# Patient Record
Sex: MALE | Race: WHITE | NOT HISPANIC OR LATINO | Employment: STUDENT | URBAN - METROPOLITAN AREA
[De-identification: names, ages, dates, MRNs, and addresses within clinical notes are randomized per-mention and may not be internally consistent; named-entity substitution may affect disease eponyms.]

---

## 2023-07-18 LAB
HBA1C MFR BLD HPLC: 5.6 %
HCV AB SER-ACNC: NONREACTIVE

## 2024-06-20 ENCOUNTER — TELEPHONE (OUTPATIENT)
Dept: BARIATRICS | Facility: CLINIC | Age: 24
End: 2024-06-20

## 2024-06-20 ENCOUNTER — OFFICE VISIT (OUTPATIENT)
Dept: BARIATRICS | Facility: CLINIC | Age: 24
End: 2024-06-20
Payer: COMMERCIAL

## 2024-06-20 VITALS
BODY MASS INDEX: 39.72 KG/M2 | SYSTOLIC BLOOD PRESSURE: 130 MMHG | HEART RATE: 102 BPM | WEIGHT: 238.4 LBS | DIASTOLIC BLOOD PRESSURE: 64 MMHG | HEIGHT: 65 IN

## 2024-06-20 DIAGNOSIS — E66.01 OBESITY, CLASS III, BMI 40-49.9 (MORBID OBESITY) (HCC): Primary | ICD-10-CM

## 2024-06-20 PROCEDURE — 99204 OFFICE O/P NEW MOD 45 MIN: CPT | Performed by: INTERNAL MEDICINE

## 2024-06-20 RX ORDER — TIRZEPATIDE 2.5 MG/.5ML
2.5 INJECTION, SOLUTION SUBCUTANEOUS WEEKLY
Qty: 2 ML | Refills: 0 | Status: SHIPPED | OUTPATIENT
Start: 2024-06-20 | End: 2024-08-15

## 2024-06-20 NOTE — TELEPHONE ENCOUNTER
Faxed received form pharmacy for Zepbound PA. Please initiate. Form will be scanned and attached to encounter.    Key: [id number]

## 2024-06-20 NOTE — PROGRESS NOTES
Assessment/Plan     Octaviano Licea is 23 y.o. year old male  who comes in for consultation for assistance with weight management.     - Discussed options of HealthyCORE-Intensive Lifestyle Intervention Program, Very Low Calorie Diet-VLCD, and Conservative Program and the role of weight loss medications.  - Patient is interested in pursuing Conservative Program    Obesity, Class III, BMI 40-49.9 (morbid obesity) ([facility])  Nutrition: Patient expressed interest in working with RD to balance his nutrition.  Patient reports his biggest challenge is having everyone who lives with him that is his parents and sister to agree upon an eating plan as the patient and his father are the primary people who cook in the house.  Patient reports his sister is more on board with healthy eating plan  -Would like to schedule RD visit bundles for nutrition goal and protein target  -Encourage patient to day with a protein rich breakfast and could consider meal replacement shakes  -Incorporate whole food and protein based snacks in between meals    Physical Activity: Provided information regarding [employer]; patient would like to discuss with sister as they might decide to join a gym together; follow general guidelines below for for aerobic and strength training    Sleep: -Recent sleep study in 2022 showed no evidence of sleep apnea patient sleeps 8+ hours    Behavioral/Stress: Start tracking current intake using MFP, so adjustments can be made by the dietitian.    Antiobesity Medications/Medical --class III obesity BMI of 40  -Patient would like to try injectable medication; prescription for Zepbound was submitted given his weight loss goals  -Other medication options that could be considered in combination as no obvious contraindications include metformin Wellbutrin and naltrexone, Topamax with phentermine being last option due to stimulant properties and abuse potential  -Full set of labs ordered    Octaviano was seen today for  consult.    Diagnoses and all orders for this visit:    Obesity, Class III, BMI 40-49.9 (morbid obesity) (HCC)  -     Hemoglobin A1C; Future  -     Insulin, fasting; Future  -     Lipid panel; Future  -     T3, free; Future  -     TSH, 3rd generation with Free T4 reflex; Future  -     Vitamin D 25 hydroxy; Future  -     Comprehensive metabolic panel; Future  -     CBC and differential; Future  -     tirzepatide (Zepbound) 2.5 mg/0.5 mL auto-injector; Inject 0.5 mL (2.5 mg total) under the skin once a week          -In addition, please follow general recommendations below.      Zepbound Instructions:    - Begin Zepbound 2.5 mg subcutaneously once a week. Dose changes may occur after 4 doses if medication is tolerated. You will be assessed prior to each dose change to make sure you are tolerating the medication well.  - Please message me when you have 2 pens left from the prescription so there are no lapses in treatment.  - Visit [email or web address] for further information/injection instructions.   - Take precautions to avoid dehydration. Aim for 64-80 oz of fluids/day  -Stop eating before you feel full  -Instructed  to administer a missed dose as soon as possible within 4 days. If more than 4 days have passed, skip the missed dose, administer the next dose on the regularly scheduled day, and resume the regular once-weekly dosing schedule  -Please eat small frequent meals to help reduce nausea. Avoid excessively fatty meals fried foods. Lemon water and saltine crackers may help with this.   - Side effects of Zepbound discussed: nausea, vomiting, diarrhea, and constipation. If you experience fever, nausea/vomiting, and pain radiating to your back this may be a sign of pancreatitis. Please go to the emergency room if this occurs.  - Consider OTC bowel regimen including stool softeners, fiber supplements to regularize bowel movements while on medication. MiraLAX can be used if needed  - - If on oral birth control a 2nd method  of birth control is recommended during the 1st 8 weeks of therapy and for 4 weeks after any dosage change.   - Patient understands the side effects of the medication and proper administration. Patient agrees with the treatment plan and all questions were answered.    Subcutaneous injection:  Inject subQ into the thigh, abdomen, or upper arm; rotate injection sites.  Administer at any time of day, with or without meals.  Administer separately from insulin (do not mix the products); may inject both medications in the same body region but not adjacent to each other.  The day of the weekly administration can be changed as long as the time between the 2 doses is at least 3 days (72 hours)  Administer a missed dose as soon as possible within 4 days (96 hours) of missed dose; if more than 4 days have passed, skip the missed dose and administer next dose on regularly scheduled day and resume regular once weekly dosing schedule     Return visit:  6-8 weeks        General Lifestyle recommendations:    Nutrition   -Avoid skipping meals. Avoid sugary beverages. At least 64oz of water daily.  Limit processed food, refined sugars and grain. Encourage  healthy choices for meals and snacks   -Focus on protein goals and non starchy fiber rich vegetables for satiety effect and to help support a calorie deficit.   - Emphasize portion control, well balanced macronutrient's (protein, carbohydrate, fat using MyPlate method )and adequate protein with each meal/snacks and distributing calories equally throughout the day along with.   -Advise starting the day with a protein breakfast   Behavioral/Stress   Food log via francisco or provided paper log (francisco options include www.Endoclear.com, sparkpeople.com, loseit.com, calorieking.com, [employer]). Encouraged mindful eating. Be sure to set aside time to eat, eat slowly, and savor your food. Consider meditation apps and/or taking a few minutes of mindfulness every AM. Understand the role of  "regarding the role of stress hormone cortisol in promoting weight gain and visceral fat accumulation. Weigh daily or atleast 2-3 times/ week  Physical Activity   Increase physical activity by 10 minutes daily. Gradually increase physical activity to a goal of 5 days per week for 30 minutes of MODERATE intensity ( should be able to pass the \"talk test\" but should not be able to sing. Target 150-300 minutes  PLUS 2 days per week of FULL BODY resistance training. Progression will be addressed at follow up visits. Encouraged contemplation regarding establishing a daily physical activity routine  - Resistance training along with increase protein intake is important to maintain and enhance metabolism  Sleep   Encourage sleep hygiene and importance of having adequate sleep duration at least > 6 hours to support response in weight loss efforts    Handouts provided :  THRIVE program at [facility]  MyPlate and food quality  Food log resources, phone francisco or paper journal  Antiobesity medications options     - Discussed at length and the role of weight loss medications and medication options   - Explained the importance of making lifestyle changes in addition to starting any anti-obesity medications if the  patient chooses.  -  Initial weight loss goal of 5-10% weight loss for improved health  - Weight loss can improve patient's co-morbid conditions and/or prevent weight-related complications.  - Weight is not at goal and patient has been unable to achieve a meaningful weight loss above 5% using various programs and tools for more than 6 months    Octaviano was seen today for consult.    Diagnoses and all orders for this visit:    Obesity, Class III, BMI 40-49.9 (morbid obesity) (HCC)  -     Hemoglobin A1C; Future  -     Insulin, fasting; Future  -     Lipid panel; Future  -     T3, free; Future  -     TSH, 3rd generation with Free T4 reflex; Future  -     Vitamin D 25 hydroxy; Future  -     Comprehensive metabolic panel; " Future  -     CBC and differential; Future  -     tirzepatide (Zepbound) 2.5 mg/0.5 mL auto-injector; Inject 0.5 mL (2.5 mg total) under the skin once a week                      Total time spent reviewing chart, interviewing patient, examining patient, discussing plan, answering all questions, and documentin min, with >50% face-to-face time spent counseling patient on nonsurgical interventions for the treatment of excess weight. Discussed in detail nonsurgical options including intensive lifestyle intervention program, very low-calorie diet program and conservative program.  Discussed the role of weight loss medications.  Counseled patient on diet behavior and exercise modification for weight loss.            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, looking to start in elementary and middle school            Start date: 2024  Intial weight : 238 lbs  Intial BMI: 40 kg/m2  Obesity Class: Above 40- Obesity Class III  Goal weight: 185 lbs          History of present illness       Onset--middle school, swimming didn't help     Fam hx of obesity- dad , grandmother    Food behaviors- cravings between lunch and dinner stress eating the last 2 years as he was attending college not as much now, no portion struggles    Previous Weight loss medications/Weight loss attempts:   Worked with a dietician in 2018   - sister is more on board,   Patient reports and other  "history-    Self referred  Wt Readings from Last 20 Encounters:   06/20/24 108 kg (238 lb 6.4 oz)           Medication considerations/contraindications     -Patient denies personal history of pancreatitis. Patient also denies personal and family history of medullary thyroid cancer and multiple endocrine neoplasia type 2 (MEN 2 tumor). -Patient denies any history of kidney stones, seizures, or glaucoma, diabetic retinopathy, gall bladder disease, hyperthyroidism.  -Denies Hx of CAD, PAD, palpitations, arrhythmia.   -Denies uncontrolled anxiety or depression, suicidal behavior or thinking , insomnia or sleep disturbance.         Past medical history/past surgical history       Previous notes and records have been reviewed.    The following portions of the patient's history were reviewed and updated as appropriate: allergies, current medications, past family history, past medical history, past social history, past surgical history, and problem list.    History reviewed. No pertinent past medical history.      History reviewed. No pertinent surgical history.          History reviewed. No pertinent family history.         Objective     /64 (BP Location: Left arm, Patient Position: Sitting, Cuff Size: Large)   Pulse 102   Ht 5' 4.7\" (1.643 m)   Wt 108 kg (238 lb 6.4 oz)   BMI 40.04 kg/m²       Review of Systems   Constitutional:  Negative for fatigue.   HENT:  Negative for sore throat.    Respiratory:  Negative for cough and shortness of breath.    Cardiovascular:  Negative for chest pain, palpitations and leg swelling.   Gastrointestinal:  Negative for abdominal pain, constipation, diarrhea and nausea.   Genitourinary:  Negative for dysuria.   Musculoskeletal:  Negative for arthralgias and back pain.   Skin:  Negative for rash.   Neurological:  Negative for headaches.   Psychiatric/Behavioral:  Negative for dysphoric mood. The patient is not nervous/anxious.        Physical Exam  Vitals and nursing note " "reviewed.   Constitutional:       Appearance: Normal appearance.   HENT:      Head: Normocephalic.   Neck:      Thyroid: No thyroid mass, thyromegaly or thyroid tenderness.   Cardiovascular:      Rate and Rhythm: Normal rate and regular rhythm.      Pulses: Normal pulses.      Heart sounds: Normal heart sounds.   Pulmonary:      Effort: Pulmonary effort is normal.      Breath sounds: Normal breath sounds.   Abdominal:      General: Abdomen is flat.      Palpations: Abdomen is soft.   Musculoskeletal:      Cervical back: Normal range of motion and neck supple. No tenderness.   Skin:     General: Skin is warm and dry.   Neurological:      General: No focal deficit present.      Mental Status: He is alert and oriented to person, place, and time.   Psychiatric:         Mood and Affect: Mood normal.         Behavior: Behavior normal.         Thought Content: Thought content normal.         Judgment: Judgment normal.            Medications       Current Outpatient Medications:     tirzepatide (Zepbound) 2.5 mg/0.5 mL auto-injector, Inject 0.5 mL (2.5 mg total) under the skin once a week, Disp: 2 mL, Rfl: 0           Labs and imaging     Recent labs and imaging have been personally reviewed.  No results found for: \"WBC\", \"HGB\", \"HCT\", \"MCV\", \"PLT\"  No results found for: \"NA\", \"SODIUM\", \"K\", \"CL\", \"CO2\", \"ANIONGAP\", \"AGAP\", \"BUN\", \"CREATININE\", \"GLUC\", \"GLUF\", \"CALCIUM\", \"AST\", \"ALT\", \"ALKPHOS\", \"PROT\", \"TP\", \"BILITOT\", \"TBILI\", \"EGFR\"  Lab Results   Component Value Date    HGBA1C 5.6 07/18/2023     No results found for: \"YUT8PZCYICZC\", \"TSH\"  No results found for: \"CHOLESTEROL\"  No results found for: \"HDL\"  No results found for: \"TRIG\"  No results found for: \"LDLCALC\"                   "

## 2024-06-20 NOTE — ASSESSMENT & PLAN NOTE
Nutrition: Patient expressed interest in working with RD to balance his nutrition.  Patient reports his biggest challenge is having everyone who lives with him that is his parents and sister to agree upon an eating plan as the patient and his father are the primary people who cook in the house.  Patient reports his sister is more on board with healthy eating plan  -Would like to schedule RD visit bundles for nutrition goal and protein target  -Encourage patient to day with a protein rich breakfast and could consider meal replacement shakes  -Incorporate whole food and protein based snacks in between meals    Physical Activity: Provided information regarding [facility]; patient would like to discuss with sister as they might decide to join a gym together; follow general guidelines below for for aerobic and strength training    Sleep: -Recent sleep study in 2022 showed no evidence of sleep apnea patient sleeps 8+ hours    Behavioral/Stress: Start tracking current intake using MFP, so adjustments can be made by the dietitian.    Antiobesity Medications/Medical --class III obesity BMI of 40  -Patient would like to try injectable medication; prescription for Zepbound was submitted given his weight loss goals  -Other medication options that could be considered in combination as no obvious contraindications include metformin Wellbutrin and naltrexone, Topamax with phentermine being last option due to stimulant properties and abuse potential  -Full set of labs ordered

## 2024-06-28 NOTE — TELEPHONE ENCOUNTER
PA for Zepbound 2.5mg    Submitted via    [x]CMM-KEY: [id number]  PA Case ID: [id number]  []Surescripts-Case ID #   []Faxed to plan   []Other website   []Phone call Case ID #     Office notes sent, clinical questions answered. Awaiting determination    Turnaround time for your insurance to make a decision on your Prior Authorization can take 7-21 business days.

## 2024-07-02 LAB — HBA1C MFR BLD HPLC: 5.6 %

## 2024-07-08 DIAGNOSIS — E66.01 OBESITY, CLASS III, BMI 40-49.9 (MORBID OBESITY) (HCC): Primary | ICD-10-CM

## 2024-07-08 RX ORDER — TIRZEPATIDE 5 MG/.5ML
5 INJECTION, SOLUTION SUBCUTANEOUS WEEKLY
Qty: 2 ML | Refills: 0 | Status: SHIPPED | OUTPATIENT
Start: 2024-07-08 | End: 2024-09-02

## 2024-07-29 ENCOUNTER — CLINICAL SUPPORT (OUTPATIENT)
Dept: BARIATRICS | Facility: CLINIC | Age: 24
End: 2024-07-29

## 2024-07-29 VITALS — HEIGHT: 65 IN | WEIGHT: 223.4 LBS | BODY MASS INDEX: 37.22 KG/M2

## 2024-07-29 DIAGNOSIS — R63.5 ABNORMAL WEIGHT GAIN: Primary | ICD-10-CM

## 2024-07-29 PROCEDURE — DB3PK

## 2024-07-29 PROCEDURE — RECHECK

## 2024-08-02 ENCOUNTER — CLINICAL SUPPORT (OUTPATIENT)
Dept: BARIATRICS | Facility: CLINIC | Age: 24
End: 2024-08-02

## 2024-08-02 VITALS — BODY MASS INDEX: 37.59 KG/M2 | WEIGHT: 223.8 LBS

## 2024-08-02 DIAGNOSIS — R63.5 ABNORMAL WEIGHT GAIN: Primary | ICD-10-CM

## 2024-08-02 PROCEDURE — RECHECK

## 2024-08-02 NOTE — PROGRESS NOTES
Patient presents for 30 minute Behavioral Health Evaluation as a part of Medical Weight Management Program, current weight 223.8lbs.    Eating behaviors/food choices: Reports history of mindless and emotional eating when in graduate school, now that he's home and especially since starting Zepbound this has greatly reduced. He is having three meals a day, notices at time absence of appetite but still makes a point to get nutrition. He denies any excessive snacking, food chatter has quieted and made his weight management journey easier. 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     Activity/Exercise:  Patient is going to the gym five days a week, takes off on the weekends. Reports this is his first time going to regualrly to the gym, making plans to keep this going when he gets a job. Plans to go after work, encouraged patient to continue rest days.    Sleep/Rest:  Patient reports not issues with sleep, notices he is more restless the night after his weekly Zepbound injection but that subsides easily. He is no longer drinking coffee at all, feels energized, no fatigue eating.    Mental Health/Wellness:  Patient reports possible depression while he was away at school directly related to missing his family and friends. Now that he's back home he is feeling much better, excited to be moving forward in his career. When experiencing stress or emotions he would use music as a coping skill, he has great support in his friends and family, and goes to the gym. He identifies the possibility of still wanting to use food as a reward after completing a major task, considering smaller portions of food rewards, discussed non-food rewards he could also work towards. Patient anticipates his stress increasing when he finally does go to work, wants to continue to meet periodically to check in on how he's managing.    Next  Appointment:  with NP on 9/3, RD on 9/9 and SW on 10/9

## 2024-08-13 DIAGNOSIS — E66.9 OBESITY, CLASS II, BMI 35-39.9: Primary | ICD-10-CM

## 2024-08-13 RX ORDER — TIRZEPATIDE 7.5 MG/.5ML
7.5 INJECTION, SOLUTION SUBCUTANEOUS WEEKLY
Qty: 2 ML | Refills: 0 | Status: SHIPPED | OUTPATIENT
Start: 2024-08-13 | End: 2024-10-08

## 2024-09-03 ENCOUNTER — OFFICE VISIT (OUTPATIENT)
Dept: BARIATRICS | Facility: CLINIC | Age: 24
End: 2024-09-03
Payer: COMMERCIAL

## 2024-09-03 VITALS
SYSTOLIC BLOOD PRESSURE: 124 MMHG | HEIGHT: 65 IN | BODY MASS INDEX: 35.45 KG/M2 | HEART RATE: 76 BPM | DIASTOLIC BLOOD PRESSURE: 70 MMHG | WEIGHT: 212.8 LBS

## 2024-09-03 DIAGNOSIS — E66.9 OBESITY, CLASS II, BMI 35-39.9: ICD-10-CM

## 2024-09-03 DIAGNOSIS — E66.01 CLASS 2 SEVERE OBESITY DUE TO EXCESS CALORIES WITH SERIOUS COMORBIDITY AND BODY MASS INDEX (BMI) OF 35.0 TO 35.9 IN ADULT (HCC): Primary | ICD-10-CM

## 2024-09-03 PROBLEM — E66.812 CLASS 2 SEVERE OBESITY DUE TO EXCESS CALORIES WITH SERIOUS COMORBIDITY AND BODY MASS INDEX (BMI) OF 35.0 TO 35.9 IN ADULT (HCC): Status: ACTIVE | Noted: 2024-06-20

## 2024-09-03 PROCEDURE — 99215 OFFICE O/P EST HI 40 MIN: CPT | Performed by: INTERNAL MEDICINE

## 2024-09-03 RX ORDER — TIRZEPATIDE 7.5 MG/.5ML
7.5 INJECTION, SOLUTION SUBCUTANEOUS WEEKLY
Qty: 2 ML | Refills: 1 | Status: SHIPPED | OUTPATIENT
Start: 2024-09-03 | End: 2024-10-29

## 2024-09-03 NOTE — ASSESSMENT & PLAN NOTE
Congratulated patient on 26 pound weight loss.  He has been tracking consistently into his report doing well with his protein intake.  He is working with the dietitian on bundles.  He will continue to focus on his protein intake and incorporate some protein based snacks in between meals.  Currently he feels no urge to eat and sometimes does not hit 1700 isacc so I have discussed setting timers so he can get the adequate nutrition especially meeting his protein goals.  Is also been incorporating walking along with weight training with his sister 2 to 3 days at the gym which she will continue.  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    Nutrition Prescription  Calories: 1,700-1,950 kcal  Protein: 73-92 g  Fluid: 71 ounces

## 2024-09-03 NOTE — PROGRESS NOTES
"  Program:     Assessment/Plan     Octaviano Licea  is a 24 y.o. male with  returns to follow up  for treatment of excess body weight and associated risk factors/co-morbidities.     Class 2 severe obesity due to excess calories with serious comorbidity and body mass index (BMI) of 35.0 to 35.9 in adult ([facility])  Congratulated patient on 26 pound weight loss.  He has been tracking consistently into his report doing well with his protein intake.  He is working with the dietitian on bundles.  He will continue to focus on his protein intake and incorporate some protein based snacks in between meals.  Currently he feels no urge to eat and sometimes does not hit 1700 isacc so I have discussed setting timers so he can get the adequate nutrition especially meeting his protein goals.  Is also been incorporating walking along with weight training with his sister 2 to 3 days at the gym which she will continue.  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    Nutrition Prescription  Calories: 1,700-1,950 kcal  Protein: 73-92 g  Fluid: 71 ounces       Most recent notes and records were reviewed.         Return visit:  2-3 months     Nutrition   Do not skip meals. Avoid sugary beverages. At least 64oz of water daily.    Behavioral/Stress   Food log via francisco or provided paper log (francisco options include www.Blippar.com, sparkpeople.com, loseit.com, calorieking.com, Stretch). Encouraged mindful eating    Physical Activity  Increase physical activity by 10 minutes daily. Gradually increase physical activity to a goal of 5 days per week for 30 minutes of MODERATE intensity ( ( should be able to pass the \"talk test\" but should not be able to sing.  target 150-300 minutes  PLUS 2-3 days per week of FULL BODY resistance training. Progression will be addressed at follow up visits. Encouraged planning regarding establishing physical activity routine    Sleep  Provided sleep hygiene " counseling and importance of having adequate sleep duration          Octaviano was seen today for follow-up.    Diagnoses and all orders for this visit:    Class 2 severe obesity due to excess calories with serious comorbidity and body mass index (BMI) of 35.0 to 35.9 in adult ([facility])    Obesity, Class II, BMI 35-39.9  -     tirzepatide (Zepbound) 7.5 mg/0.5 mL auto-injector; Inject 0.5 mL (7.5 mg total) under the skin once a week                Total time spent reviewing chart, interviewing patient, examining patient, discussing plan, answering all questions, and documentin minutes with >50% face-to-face time with the patient.            Weight trajectory     Wt Readings from Last 20 Encounters:   24 96.5 kg (212 lb 12.8 oz)   24 102 kg (223 lb 12.8 oz)   24 101 kg (223 lb 6.4 oz)   24 108 kg (238 lb 6.4 oz)               Weight/ Lifestyle history/HPI     Patient reports   Met with dietitian  Nutrition Prescription  Calories: 1,700-1,950 kcal  Protein: 73-92 g  Fluid: 71 ounces    Tracking consistently meeting protein targets  Diet recall:  B: PS  S: no  L: Leftovers   D: Largest meal protein salad and carb  S: No snacks between breakfast and lunch  Eating out vs cooking at home- once a month, likes to cook     Beverages  Water--  64     Caffeine/tea--  12 oz one cup   SSB- no     Alcohol: once a year  Smoking: no  Drug use: no     Physical Activity --walking regularly 2 miles with sister  started the gym weights 2-3 days      Sleep -- sleep study in  was negative , 8+ hours      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, looking to start in elementary and middle school                 Start date: 2024  Intial weight : 238 lbs  Intial BMI: 40 kg/m2  Obesity Class: Above 40- Obesity Class III  Goal weight: 185 lbs         Current Weight on : 212 lbs  Current BMI : 35.74 kg/m2 35.0-39.9- Obesity Class II  Difference: -26 lbs      Anti  "obesity Medications/ Medical---    Weight loss medication and dose: Zepbound  Date initiated: June     Mild constipation improved  Appetite suppression-no desire to eat          Objective         The following portions of the patient's history were reviewed and updated as appropriate: allergies, current medications, past family history, past medical history, past social history, past surgical history, and problem list.      /70 (BP Location: Left arm, Patient Position: Sitting, Cuff Size: Large)   Pulse 76   Ht 5' 4.7\" (1.643 m)   Wt 96.5 kg (212 lb 12.8 oz)   BMI 35.74 kg/m²             Review of Systems   Constitutional:  Negative for fatigue.   HENT:  Negative for sore throat.    Respiratory:  Negative for cough and shortness of breath.    Cardiovascular:  Negative for chest pain, palpitations and leg swelling.   Gastrointestinal:  Negative for abdominal pain, constipation, diarrhea and nausea.   Genitourinary:  Negative for dysuria.   Musculoskeletal:  Negative for arthralgias and back pain.   Skin:  Negative for rash.   Neurological:  Negative for headaches.   Psychiatric/Behavioral:  Negative for dysphoric mood. The patient is not nervous/anxious.          Physical Exam  Vitals and nursing note reviewed.   Constitutional:       Appearance: Normal appearance.   HENT:      Head: Normocephalic.   Pulmonary:      Effort: Pulmonary effort is normal.   Neurological:      General: No focal deficit present.      Mental Status: She is alert and oriented to person, place, and time.   Psychiatric:         Mood and Affect: Mood normal.         Behavior: Behavior normal.         Thought Content: Thought content normal.         Judgment: Judgment normal.          Current medications       Current Outpatient Medications:     tirzepatide (Zepbound) 7.5 mg/0.5 mL auto-injector, Inject 0.5 mL (7.5 mg total) under the skin once a week, Disp: 2 mL, Rfl: 1         Medication considerations/contraindications     -Patient " "denies personal history of pancreatitis. Patient also denies personal and family history of medullary thyroid cancer, and multiple endocrine neoplasia type 2 (MEN 2 tumor). -Patient denies any history of kidney stones, seizures, or glaucoma, diabetic retinopathy, gall bladder disease, gastroparesis, hyperthyroidism.  -Denies Hx of CAD, PAD, palpitations, arrhythmia, uncontrolled hypertension  -Denies uncontrolled anxiety or depression, suicidal behavior or thinking , insomnia or sleep disturbance.         Labs     Most recent labs reviewed   No results found for: \"NA\", \"SODIUM\", \"K\", \"CL\", \"CO2\", \"ANIONGAP\", \"AGAP\", \"BUN\", \"CREATININE\", \"GLUC\", \"GLUF\", \"CALCIUM\", \"AST\", \"ALT\", \"ALKPHOS\", \"PROT\", \"TP\", \"BILITOT\", \"TBILI\", \"EGFR\"  Lab Results   Component Value Date    HGBA1C 5.6 07/18/2023     No results found for: \"PPI3JNQODGUY\", \"TSH\"  No results found for: \"CHOLESTEROL\"  No results found for: \"HDL\"  No results found for: \"TRIG\"  No results found for: \"LDLCALC\"  No results found for: \"VITD\"  No components found for: \"FASTINS\"   "

## 2024-09-09 ENCOUNTER — CLINICAL SUPPORT (OUTPATIENT)
Dept: BARIATRICS | Facility: CLINIC | Age: 24
End: 2024-09-09

## 2024-09-09 VITALS — BODY MASS INDEX: 35.25 KG/M2 | WEIGHT: 211.6 LBS | HEIGHT: 65 IN

## 2024-09-09 DIAGNOSIS — R63.5 ABNORMAL WEIGHT GAIN: Primary | ICD-10-CM

## 2024-09-09 PROCEDURE — RECHECK

## 2024-09-16 DIAGNOSIS — E66.9 OBESITY, CLASS II, BMI 35-39.9: ICD-10-CM

## 2024-09-17 RX ORDER — TIRZEPATIDE 7.5 MG/.5ML
7.5 INJECTION, SOLUTION SUBCUTANEOUS WEEKLY
Qty: 2 ML | Refills: 0 | Status: SHIPPED | OUTPATIENT
Start: 2024-09-17 | End: 2024-09-18 | Stop reason: SDUPTHER

## 2024-09-18 DIAGNOSIS — E66.9 OBESITY, CLASS II, BMI 35-39.9: ICD-10-CM

## 2024-09-18 RX ORDER — TIRZEPATIDE 10 MG/.5ML
10 INJECTION, SOLUTION SUBCUTANEOUS WEEKLY
Qty: 6 ML | Refills: 0 | Status: SHIPPED | OUTPATIENT
Start: 2024-09-18 | End: 2024-09-24 | Stop reason: SDUPTHER

## 2024-09-18 RX ORDER — TIRZEPATIDE 7.5 MG/.5ML
7.5 INJECTION, SOLUTION SUBCUTANEOUS WEEKLY
Qty: 2 ML | Refills: 0 | Status: SHIPPED | OUTPATIENT
Start: 2024-09-18 | End: 2024-11-13

## 2024-09-19 NOTE — TELEPHONE ENCOUNTER
Patient called in due to patient level Prior-Auth request he submitted to his insurance for medication Zepbound 7.5mg, patient is checking to see if his insurance already sent over the approval letter to us as of yet.

## 2024-09-24 ENCOUNTER — TELEPHONE (OUTPATIENT)
Dept: BARIATRICS | Facility: CLINIC | Age: 24
End: 2024-09-24

## 2024-09-24 DIAGNOSIS — E66.812 OBESITY, CLASS II, BMI 35-39.9: ICD-10-CM

## 2024-09-24 DIAGNOSIS — E66.9 OBESITY, CLASS II, BMI 35-39.9: ICD-10-CM

## 2024-09-24 RX ORDER — TIRZEPATIDE 10 MG/.5ML
10 INJECTION, SOLUTION SUBCUTANEOUS WEEKLY
Qty: 6 ML | Refills: 0 | Status: SHIPPED | OUTPATIENT
Start: 2024-09-24 | End: 2024-09-24 | Stop reason: SDUPTHER

## 2024-09-24 RX ORDER — TIRZEPATIDE 10 MG/.5ML
10 INJECTION, SOLUTION SUBCUTANEOUS WEEKLY
Qty: 6 ML | Refills: 0 | Status: SHIPPED | OUTPATIENT
Start: 2024-09-24 | End: 2024-12-17

## 2024-09-24 NOTE — TELEPHONE ENCOUNTER
Reason for call: Not a duplicate. Patient stated that he is required by his insurance to fill this through mail order pharmacy  [x] Refill   [] Prior Auth  [] Other:     Office:   [] PCP/Provider -   [x] Specialty/Provider -  /Jena Evans MD     Medication: tirzepatide (Zepbound) 10 mg/0.5 mL auto-injector     Dose/Frequency: Inject 0.5 mL (10 mg total) under the skin once a week     Quantity: 6ml    Pharmacy: [facility] - [address]     Does the patient have enough for 3 days?   [] Yes   [x] No - Send as HP to POD

## 2024-09-24 NOTE — TELEPHONE ENCOUNTER
Patient called the RX Refill Line. Message is being forwarded to the office.     Patient is requesting a call back about this PA, he's not sure if it's been done or not as he's not heard anything back from the office  Pt has been without his medication and would like a call back with an update as soon as possible  He also mentioned that the pharmacy told him the script for the 10 was canceled so he'd need it sent again and the quantity for the 7.5 was wrong and he'd need a new script for that as well    Pt provider # to call for PA, it's [phone], was told by his insurance that calling this # would get it done quicker     Please contact patient at

## 2024-09-24 NOTE — TELEPHONE ENCOUNTER
Good Day,    I spoke with pharmacist this morning. I see an order for Zepbound 10mg in patient's chart, but pharmacy states they don't have request. Is it possible to send in prescription again ?      Thank you.

## 2024-10-09 ENCOUNTER — CLINICAL SUPPORT (OUTPATIENT)
Dept: BARIATRICS | Facility: CLINIC | Age: 24
End: 2024-10-09

## 2024-10-09 VITALS — WEIGHT: 204.8 LBS | BODY MASS INDEX: 34.4 KG/M2

## 2024-10-09 DIAGNOSIS — R63.5 ABNORMAL WEIGHT GAIN: Primary | ICD-10-CM

## 2024-10-09 PROCEDURE — RECHECK

## 2024-10-09 NOTE — PROGRESS NOTES
Patient presents for 30 minute Behavioral Health Evaluation as a part of Medical Weight Management Program, current weight 204.8lbs.    Eating behaviors/food choices: patient reports eating habits continue to be well monitored, during the week he may wake a little later so may not get breakfast but he does eat three meals on the weekends. Encouraged patient to pay attention to the frequency of his eating to confirm whether he might be getting three meals and to avoid skipping. He enjoys cooking with his family but they tend to make foods that are higher in calories so he modifies his portions and usually pairs their main dish with a salad for himself. He is experiencing continued appetite suppression with the Zepbound however he is logging his meals to make sure he is getting adequate nutrition. He did question whether he may be losing weight too quickly, encouraged patient to keep logging foods, make sure he's getting adequate nutrition, especially protein and not skip meals to reassure he's meeting his needs.    Activity/Exercise:  Patient is going to the gym three days a week rather than five since his sister's work schedule has changed. He enjoys going to the gym with her for accountability and feels this frequency will be easier for him to stick with.     Sleep/Rest:  patient reports sleep continues to be be good, no issues with falling asleep. He doesn't notice any changes in energy with his Zepbound injections as he reported before, he believe he was going through an episode of insomnia during that time which has subsided.    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 He reports decreased motivation and energy, not having much  purpose since he isn't currently working. He does notice these triggers and symptoms, he is using coping skills of keeping himself busy, applying for jobs, helping his grandparents and continuing to be social with friends and family. Encouraged patient to accept his feelings and allow himself to feel them so he has awareness of what may trigger behaviors such as emotional eating. Patient denies any emotional eating currently but remains vigilant that his may occur if he doesn't stay mindful. Reminded of career resources available to him as well as therapy if he finds his depression to be continuing.    Next Appointment:  with ZIYAD on 11/14

## 2024-11-21 DIAGNOSIS — E66.812 OBESITY, CLASS II, BMI 35-39.9: ICD-10-CM

## 2024-11-22 RX ORDER — TIRZEPATIDE 10 MG/.5ML
10 INJECTION, SOLUTION SUBCUTANEOUS WEEKLY
Qty: 6 ML | Refills: 0 | Status: SHIPPED | OUTPATIENT
Start: 2024-11-22 | End: 2025-02-14

## 2024-12-11 ENCOUNTER — CLINICAL SUPPORT (OUTPATIENT)
Dept: BARIATRICS | Facility: CLINIC | Age: 24
End: 2024-12-11

## 2024-12-11 VITALS — WEIGHT: 189 LBS | BODY MASS INDEX: 31.74 KG/M2

## 2024-12-11 DIAGNOSIS — R63.5 ABNORMAL WEIGHT GAIN: Primary | ICD-10-CM

## 2024-12-11 PROCEDURE — RECHECK

## 2024-12-11 NOTE — PROGRESS NOTES
Patient presents for 30 minute Behavioral Health consult as a part of Medical Weight Management Program, current weight 189lbs.    Eating behaviors/food choices: patient continues to do well with eating regiment, he feels good restriction with his medication and modifies his portions based on satiation. His insurance is going to change in the new year so he has some concerns about containing his meds but he is looking into options. He has good support with family and friends on his eating habits. Encouraged to continue planning for when he gets a full time job, how his eating regiment may need to be adjusted and staying true to his weight management journey.    Activity/Exercise:  Patient continues to go to the gym with his sister although he's only going once per week. He plans to continue this once per week and increase.    Sleep/Rest:  patient reports sleep has improved, he is not noticing insomnia with his dose titration as he was.     Mental Health/Wellness:  Patient reports connecting with a psychiatrist for depression medication, he is currently on Prozac. He feels a significant differences now that he's on that medication, he is feeling more hopeful about his future although he is still looking for a full time position. He is considering therapy, he has resources to connect if and when he's ready to engage. Overall feeling very good and positive about his weight and health.    Next Appointment: with ZIYAD on 12/12

## 2024-12-11 NOTE — PROGRESS NOTES
"Weight Management Medical Nutrition Assessment  Octaviano is here today for 2/3 bundle visit. Current Weight: 187.2#. Patient has lost 17.6# x 2 months and overall has lost 51.2#. Patient continues with Zepbound, currently utilizing 10 mg. Patient reports he has stopped logging and decreased his activity over the last month. Reviewed calorie needs with activity 1-2 days/week. Will return to food logging to identify any nutrition gaps. Protein goal reviewed. Continues with mindfulness and reports activity will increase after holiday season. Will f/u x 6 weeks. Keep up the good work!    Likes: eggs, cheese, nuts, peanut butter, Greek yogurt, hummus     Patient seen by Medical Provider in past 6 months:  yes  Requested to schedule appointment with Medical Provider: No    Anthropometric Measurements  Provider Start Weight (#): 238.4# (24)  Current Weight (#): 187.2#  TBW % Change from start weight: 21.5%  IBW: 134.2# (64.7\")  Goal Weight (#): ST# LT#    Weight Loss History  Previous weight loss attempts: Exercise  Nutrition Counseling with RD, Self Created Diets (Portion Control, Healthy Food Choices, etc.)    Food and Nutrition Related History  Wake up: 8:30-9 am    Bed Time: 12 am   Sleep quality: well    Dietary Recall  Coffee w/ SF creamer  Breakfast (10 am-12 pm): 1 egg w/ toast Or -- Or core power shake    Snack: --  Lunch (2-3 pm): leftovers Or sandwich w/ 647 bread Or salad w/ protein   Snack: -- (more often) Or pretzels w/ hummus   Dinner: protein/vegetable/carb  Snack: --     Beverages: water, 12 oz coffee (SF creamer), alcohol rarely   Volume of beverage intake: 64-80 oz water     Weekends: more food prepared by parents on weekends   Cravings: carbs but reduced since medication intervention   Trouble area of day: none as of recent     Frequency of Eating out: rare - patient enjoys cooking   Food restrictions: none - limits fish/seafood   Cooking: self, sister, and father  Food Shopping: self, " sister, and father  Struggles with family meal times and agreeing on same foods  Sister is supportive of weight loss goals but dad may not be    Occupation: just finished graduate school (music theory), 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     Physical Activity  Activity: [employer] (strength training + 20 min elliptical/stair master)  Frequency: 1-2x/week    Physical limitations/barriers to exercise: none     Estimated Needs  Danii Schofield Energy Needs (needs at 187#)  BMR: 1,761 kcal  Maintenance calories (sedentary): 2,113 kcal  1-2#/week loss (sedentary): 1,113-1,613 kcal  1-2#/week loss (light activity): 1,422-1,922 kcal  1-2#/week loss (moderate): 1,580-2,080 kcal    Protein: 73-92 grams (1.2-1.5g/kg IBW)  Fluid: 71 ounces (35mL/kg IBW)    Nutrition Diagnosis  Yes;    Overweight/obesity related to Excess energy intake as evidenced by BMI more than normative standard for age and sex (obesity-grade I 30-34.9)     Nutrition Intervention    Nutrition Prescription  Calories: 1,500-1,900 kcal  Protein: 73-92 g  Fluid: 71 ounces    Meal Plan (Austen/Pro)  Breakfast: 300-450/30-35  Snack: --  Lunch: 550-600/35  Snack: 150/5  Dinner: 550-600/35  Snack: 150/5    Nutrition Education  Calorie controlled menu  Lean protein food choices  Healthy snack options  Food journaling tips    Nutrition Counseling  Strategies: meal planning, portion sizes, healthy snack choices, hydration, fiber intake, protein intake, exercise, food logging    Monitoring and Evaluation:    Evaluation criteria  Energy Intake  Meet protein needs  Maintain adequate hydration  Monitor weekly weight  Meal planning/preparation  Food journal   Decreased portions at mealtimes and snacks  Physical activity     Barriers to change:none  Readiness to change: Action:  (Changing behavior)  Comprehension: very good  Expected Compliance: very good

## 2024-12-12 ENCOUNTER — CLINICAL SUPPORT (OUTPATIENT)
Dept: BARIATRICS | Facility: CLINIC | Age: 24
End: 2024-12-12

## 2024-12-12 VITALS — BODY MASS INDEX: 31.19 KG/M2 | WEIGHT: 187.2 LBS | HEIGHT: 65 IN

## 2024-12-12 DIAGNOSIS — R63.5 ABNORMAL WEIGHT GAIN: Primary | ICD-10-CM

## 2024-12-12 PROCEDURE — RECHECK

## 2024-12-17 ENCOUNTER — OFFICE VISIT (OUTPATIENT)
Dept: BARIATRICS | Facility: CLINIC | Age: 24
End: 2024-12-17
Payer: COMMERCIAL

## 2024-12-17 VITALS
SYSTOLIC BLOOD PRESSURE: 140 MMHG | BODY MASS INDEX: 31.32 KG/M2 | WEIGHT: 188 LBS | OXYGEN SATURATION: 98 % | HEIGHT: 65 IN | DIASTOLIC BLOOD PRESSURE: 90 MMHG | HEART RATE: 74 BPM

## 2024-12-17 DIAGNOSIS — E66.811 CLASS 1 OBESITY DUE TO EXCESS CALORIES WITH SERIOUS COMORBIDITY AND BODY MASS INDEX (BMI) OF 31.0 TO 31.9 IN ADULT: ICD-10-CM

## 2024-12-17 DIAGNOSIS — K80.20 CHOLELITHIASIS: Primary | ICD-10-CM

## 2024-12-17 DIAGNOSIS — E66.09 CLASS 1 OBESITY DUE TO EXCESS CALORIES WITH SERIOUS COMORBIDITY AND BODY MASS INDEX (BMI) OF 31.0 TO 31.9 IN ADULT: ICD-10-CM

## 2024-12-17 PROCEDURE — 99215 OFFICE O/P EST HI 40 MIN: CPT | Performed by: INTERNAL MEDICINE

## 2024-12-17 NOTE — ASSESSMENT & PLAN NOTE
Patient is doing well and has lost 50 pounds.  He is currently on Zepbound 10 mg.  He has finished 3 months of therapy and has picked up another 3-month prescription.  He reports his insurance would change around February of March 2025.  He is currently on his father's insurance and is looking for a new job and perhaps insurance through his employer.  If that does not work out then he would have to likely go on federal insurance such as Medicaid.  He is weighing his options for employment.  He is currently tolerating the Zepbound well and will continue the same dose for the next 3 months.  At the end of that time period, I discussed couple of options.  Patient had inquired about compounding tirzepatide.  I discussed with patient that per network policy we would not be able to utilize her prescriptions for compounded medications.  Patient verbalized understanding.  The other option that was provided was the Zepbound vial.  Although the dose limitation is 5 mg with the vial option,-we discussed adding a day at gym and to focus on resistance training.  This would also help him with his mental health struggles.  Patient reported depression after initiating Zepbound.  However she he states that he has had this issue before and was not on any antidepressants.  He denies any suicidal ideation.  I reviewed with patient the importance of him finding a primary care provider and to reach out to me if there is any worsening in symptoms of depression.  He was recently started on Prozac through a telehealth platform and he seems to be doing well on this medication.  Plan would be for him to continue to stay on the 10 mg of Zepbound for 3 more months.  Patient will consider the above option such as the Zepbound vial or using his savings card through a new employer based insurance.  He will also consider oral medications.    Nutrition Prescription  Calories: 1,700-1,950 kcal  Protein: 73-92 g  Fluid: 71 ounces    Patient also  reported 2 episodes of right upper quadrant pain which were self-limiting.  Will obtain a right upper quadrant ultrasound to evaluate for gallbladder disease.    -Other medication options that could be considered in combination as no obvious contraindications include metformin Wellbutrin and naltrexone, Topamax with phentermine being last option due to stimulant properties and abuse potential.     Sleep -- sleep study in 2022 was negative , 8+ hours

## 2024-12-17 NOTE — PROGRESS NOTES
Program:     Assessment/Plan     Octaviano Licea  is a 24 y.o. male with  returns to follow up  for treatment of excess body weight and associated risk factors/co-morbidities.     Class 1 obesity due to excess calories with serious comorbidity and body mass index (BMI) of 31.0 to 31.9 in adult  Patient is doing well and has lost 50 pounds.  He is currently on Zepbound 10 mg.  He has finished 3 months of therapy and has picked up another 3-month prescription.  He reports his insurance would change around February of March 2025.  He is currently on his father's insurance and is looking for a new job and perhaps insurance through his employer.  If that does not work out then he would have to likely go on federal insurance such as Medicaid.  He is weighing his options for employment.  He is currently tolerating the Zepbound well and will continue the same dose for the next 3 months.  At the end of that time period, I discussed couple of options.  Patient had inquired about compounding tirzepatide.  I discussed with patient that per network policy we would not be able to utilize her prescriptions for compounded medications.  Patient verbalized understanding.  The other option that was provided was the Zepbound vial.  Although the dose limitation is 5 mg with the vial option,-we discussed adding a day at gym and to focus on resistance training.  This would also help him with his mental health struggles.  Patient reported depression after initiating Zepbound.  However she he states that he has had this issue before and was not on any antidepressants.  He denies any suicidal ideation.  I reviewed with patient the importance of him finding a primary care provider and to reach out to me if there is any worsening in symptoms of depression.  He was recently started on Prozac through a telehealth platform and he seems to be doing well on this medication.  Plan would be for him to continue to stay on the 10 mg of Zepbound for 3  "more months.  Patient will consider the above option such as the Zepbound vial or using his savings card through a new employer based insurance.  He will also consider oral medications.    Nutrition Prescription  Calories: 1,700-1,950 kcal  Protein: 73-92 g  Fluid: 71 ounces    Patient also reported 2 episodes of right upper quadrant pain which were self-limiting.  Will obtain a right upper quadrant ultrasound to evaluate for gallbladder disease.    -Other medication options that could be considered in combination as no obvious contraindications include metformin Wellbutrin and naltrexone, Topamax with phentermine being last option due to stimulant properties and abuse potential.     Sleep -- sleep study in 2022 was negative , 8+ hours          Most recent notes and records were reviewed.         Return visit:  2-3 months     Nutrition   Do not skip meals. Avoid sugary beverages. At least 64oz of water daily.    Behavioral/Stress   Food log via franc[place] or provided paper log ([place] options include www.myfitnesspal.com, sparkpeople.com, loseit.com, calorieking.com, [employer] [place]). Encouraged mindful eating    Physical Activity  Increase physical activity by 10 minutes daily. Gradually increase physical activity to a goal of 5 days per week for 30 minutes of MODERATE intensity ( ( should be able to pass the \"talk test\" but should not be able to sing.  target 150-300 minutes  PLUS 2-3 days per week of FULL BODY resistance training. Progression will be addressed at follow up visits. Encouraged planning regarding establishing physical activity routine    Sleep  Provided sleep hygiene counseling and importance of having adequate sleep duration          Octaviano was seen today for follow-up.    Diagnoses and all orders for this visit:    Cholelithiasis  -     US right upper quadrant; Future    Class 1 obesity due to excess calories with serious comorbidity and body mass index (BMI) of 31.0 to 31.9 in adult                  Total time " spent reviewing chart, interviewing patient, examining patient, discussing plan, answering all questions, and documentin minutes with >50% face-to-face time with the patient.            Weight trajectory     Wt Readings from Last 20 Encounters:   24 85.3 kg (188 lb)   24 84.9 kg (187 lb 3.2 oz)   24 85.7 kg (189 lb)   10/09/24 92.9 kg (204 lb 12.8 oz)   24 96 kg (211 lb 9.6 oz)   24 96.5 kg (212 lb 12.8 oz)   24 102 kg (223 lb 12.8 oz)   24 101 kg (223 lb 6.4 oz)   24 108 kg (238 lb 6.4 oz)               Weight/ Lifestyle history/HPI     Patient reports   Met with dietitian  Nutrition Prescription  Calories: 1,700-1,950 kcal  Protein: 73-92 g  Fluid: 71 ounces    Tracking consistently meeting protein targets  Diet recall: From RD note  Coffee w/ SF creamer  Breakfast (10 am-12 pm): 1 egg w/ toast Or -- Or core power shake                         Snack: --  Lunch (2-3 pm): leftovers Or sandwich w/ 647 bread Or salad w/ protein   Snack: -- (more often) Or pretzels w/ hummus   Dinner: protein/vegetable/carb  Snack: --      Beverages: water, 12 oz coffee (SF creamer), alcohol rarely   Volume of beverage intake: 64-80 oz water      Weekends: more food prepared by parents on weekends   Cravings: carbs but reduced since medication intervention   Trouble area of day: none as of recent      Frequency of Eating out: rare - patient enjoys cooking   Food restrictions: none - limits fish/seafood   Cooking: self, sister, and father  Food Shopping: self, sister, and father  Struggles with family meal times and agreeing on same foods  Sister is supportive of weight loss goals but dad may not be  Eating out vs cooking at home- once a month, likes to cook     Beverages  Water--  64     Caffeine/tea--  12 oz one cup   SSB- no     Alcohol: once a year  Smoking: no  Drug use: no     Physical Activity --walking regularly 2 miles with sister  started the gym weights          "  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, looking to start in elementary and middle school                 Start date: 6/20/2024  Intial weight : 238 lbs  Intial BMI: 40 kg/m2  Obesity Class: Above 40- Obesity Class III  Goal weight: 185 lbs         Current Weight on 9/3/24 : 212 lbs  Current BMI 9/3/24: 35.74 kg/m2 35.0-39.9- Obesity Class II  Difference: -26 lbs    Weight on 12/17/2024 : 188 lbs(-24)  BMI on  12/17/2024 : 31.58 kg/m2 30.0-34.9- Obesity Class I  Difference: -50 lbs           Anti obesity Medications/ Medical---    Weight loss medication and dose: Zepbound  Date initiated: June 2024    Mild constipation improved  Appetite suppression-no desire to eat          Objective         The following portions of the patient's history were reviewed and updated as appropriate: allergies, current medications, past family history, past medical history, past social history, past surgical history, and problem list.      /90 (BP Location: Left arm, Patient Position: Sitting, Cuff Size: Standard)   Pulse 74   Ht 5' 4.7\" (1.643 m)   Wt 85.3 kg (188 lb)   SpO2 98%   BMI 31.58 kg/m²             Review of Systems   Constitutional:  Negative for fatigue.   HENT:  Negative for sore throat.    Respiratory:  Negative for cough and shortness of breath.    Cardiovascular:  Negative for chest pain, palpitations and leg swelling.   Gastrointestinal:  Negative for abdominal pain, constipation, diarrhea and nausea.   Genitourinary:  Negative for dysuria.   Musculoskeletal:  Negative for arthralgias and back pain.   Skin:  Negative for rash.   Neurological:  Negative for headaches.   Psychiatric/Behavioral:  Negative for dysphoric mood. The patient is not nervous/anxious.          Physical Exam  Vitals and nursing note reviewed.   Constitutional:       Appearance: Normal appearance.   HENT:      Head: Normocephalic.   Pulmonary:      Effort: Pulmonary effort is normal. " "  Neurological:      General: No focal deficit present.      Mental Status: She is alert and oriented to person, place, and time.   Psychiatric:         Mood and Affect: Mood normal.         Behavior: Behavior normal.         Thought Content: Thought content normal.         Judgment: Judgment normal.          Current medications       Current Outpatient Medications:     FLUoxetine (PROzac) 20 mg capsule, Take 20 mg by mouth daily, Disp: , Rfl:     tirzepatide (Zepbound) 10 mg/0.5 mL auto-injector, Inject 0.5 mL (10 mg total) under the skin once a week, Disp: 6 mL, Rfl: 0         Medication considerations/contraindications     -Patient denies personal history of pancreatitis. Patient also denies personal and family history of medullary thyroid cancer, and multiple endocrine neoplasia type 2 (MEN 2 tumor). -Patient denies any history of kidney stones, seizures, or glaucoma, diabetic retinopathy, gall bladder disease, gastroparesis, hyperthyroidism.  -Denies Hx of CAD, PAD, palpitations, arrhythmia, uncontrolled hypertension  -Denies uncontrolled anxiety or depression, suicidal behavior or thinking , insomnia or sleep disturbance.         Labs     Most recent labs reviewed   No results found for: \"NA\", \"SODIUM\", \"K\", \"CL\", \"CO2\", \"ANIONGAP\", \"AGAP\", \"BUN\", \"CREATININE\", \"GLUC\", \"GLUF\", \"CALCIUM\", \"AST\", \"ALT\", \"ALKPHOS\", \"PROT\", \"TP\", \"BILITOT\", \"TBILI\", \"EGFR\"  Lab Results   Component Value Date    HGBA1C 5.6 07/02/2024     No results found for: \"[id number]\", \"TSH\"  No results found for: \"CHOLESTEROL\"  No results found for: \"HDL\"  No results found for: \"TRIG\"  No results found for: \"LDLCALC\"  No results found for: \"VITD\"  No components found for: \"FASTINS\"   "

## 2024-12-20 ENCOUNTER — HOSPITAL ENCOUNTER (OUTPATIENT)
Dept: RADIOLOGY | Facility: HOSPITAL | Age: 24
Discharge: HOME | End: 2024-12-20
Payer: COMMERCIAL

## 2024-12-20 DIAGNOSIS — K80.20 CHOLELITHIASIS: ICD-10-CM

## 2024-12-20 PROCEDURE — 76705 ECHO EXAM OF ABDOMEN: CPT

## 2024-12-30 ENCOUNTER — RESULTS FOLLOW-UP (OUTPATIENT)
Dept: BARIATRICS | Facility: CLINIC | Age: 24
End: 2024-12-30

## 2025-02-24 ENCOUNTER — TELEPHONE (OUTPATIENT)
Dept: BARIATRICS | Facility: CLINIC | Age: 25
End: 2025-02-24